# Patient Record
Sex: FEMALE | Race: WHITE | NOT HISPANIC OR LATINO | Employment: UNEMPLOYED | URBAN - METROPOLITAN AREA
[De-identification: names, ages, dates, MRNs, and addresses within clinical notes are randomized per-mention and may not be internally consistent; named-entity substitution may affect disease eponyms.]

---

## 2018-07-15 ENCOUNTER — HOSPITAL ENCOUNTER (OUTPATIENT)
Facility: HOSPITAL | Age: 28
Discharge: HOME | End: 2018-07-16
Attending: EMERGENCY MEDICINE | Admitting: EMERGENCY MEDICINE

## 2018-07-15 DIAGNOSIS — K80.20 CHOLECYSTOLITHIASIS: ICD-10-CM

## 2018-07-15 DIAGNOSIS — K81.0 ACUTE CHOLECYSTITIS: Primary | ICD-10-CM

## 2018-07-15 PROCEDURE — 99284 EMERGENCY DEPT VISIT MOD MDM: CPT

## 2018-07-15 PROCEDURE — G0378 HOSPITAL OBSERVATION PER HR: HCPCS

## 2018-07-15 PROCEDURE — 96375 TX/PRO/DX INJ NEW DRUG ADDON: CPT

## 2018-07-15 RX ORDER — SODIUM CHLORIDE 0.9 % (FLUSH) 0.9 %
10 SYRINGE (ML) INJECTION AS NEEDED
Status: DISCONTINUED | OUTPATIENT
Start: 2018-07-15 | End: 2018-07-16 | Stop reason: HOSPADM

## 2018-07-16 ENCOUNTER — ANESTHESIA EVENT (OUTPATIENT)
Dept: PERIOP | Facility: HOSPITAL | Age: 28
End: 2018-07-16

## 2018-07-16 ENCOUNTER — APPOINTMENT (OUTPATIENT)
Dept: CT IMAGING | Facility: HOSPITAL | Age: 28
End: 2018-07-16

## 2018-07-16 ENCOUNTER — ANESTHESIA (OUTPATIENT)
Dept: PERIOP | Facility: HOSPITAL | Age: 28
End: 2018-07-16

## 2018-07-16 VITALS
SYSTOLIC BLOOD PRESSURE: 120 MMHG | TEMPERATURE: 98.8 F | WEIGHT: 154.98 LBS | HEIGHT: 60 IN | HEART RATE: 66 BPM | OXYGEN SATURATION: 100 % | BODY MASS INDEX: 30.43 KG/M2 | RESPIRATION RATE: 16 BRPM | DIASTOLIC BLOOD PRESSURE: 77 MMHG

## 2018-07-16 PROBLEM — K81.0 ACUTE CHOLECYSTITIS: Status: ACTIVE | Noted: 2018-07-16

## 2018-07-16 PROBLEM — K81.9 CHOLECYSTITIS: Status: ACTIVE | Noted: 2018-07-16

## 2018-07-16 PROBLEM — K81.0 ACUTE CHOLECYSTITIS: Status: RESOLVED | Noted: 2018-07-16 | Resolved: 2018-07-16

## 2018-07-16 PROBLEM — K81.9 CHOLECYSTITIS: Status: RESOLVED | Noted: 2018-07-16 | Resolved: 2018-07-16

## 2018-07-16 LAB
ALBUMIN SERPL-MCNC: 4.4 G/DL (ref 3.5–5.2)
ALBUMIN/GLOB SERPL: 1.6 G/DL
ALP SERPL-CCNC: 94 U/L (ref 39–117)
ALT SERPL W P-5'-P-CCNC: 24 U/L (ref 1–33)
ANION GAP SERPL CALCULATED.3IONS-SCNC: 11.8 MMOL/L
AST SERPL-CCNC: 12 U/L (ref 1–32)
BASOPHILS # BLD AUTO: 0.03 10*3/MM3 (ref 0–0.2)
BASOPHILS NFR BLD AUTO: 0.2 % (ref 0–1.5)
BILIRUB SERPL-MCNC: 0.2 MG/DL (ref 0.1–1.2)
BILIRUB UR QL STRIP: NEGATIVE
BUN BLD-MCNC: 15 MG/DL (ref 6–20)
BUN/CREAT SERPL: 23.1 (ref 7–25)
CALCIUM SPEC-SCNC: 8.9 MG/DL (ref 8.6–10.5)
CHLORIDE SERPL-SCNC: 104 MMOL/L (ref 98–107)
CLARITY UR: CLEAR
CO2 SERPL-SCNC: 24.2 MMOL/L (ref 22–29)
COLOR UR: YELLOW
CREAT BLD-MCNC: 0.65 MG/DL (ref 0.57–1)
DEPRECATED RDW RBC AUTO: 47.2 FL (ref 37–54)
EOSINOPHIL # BLD AUTO: 0.15 10*3/MM3 (ref 0–0.7)
EOSINOPHIL NFR BLD AUTO: 1 % (ref 0.3–6.2)
ERYTHROCYTE [DISTWIDTH] IN BLOOD BY AUTOMATED COUNT: 12.8 % (ref 11.7–13)
GFR SERPL CREATININE-BSD FRML MDRD: 109 ML/MIN/1.73
GLOBULIN UR ELPH-MCNC: 2.8 GM/DL
GLUCOSE BLD-MCNC: 128 MG/DL (ref 65–99)
GLUCOSE UR STRIP-MCNC: NEGATIVE MG/DL
HCG SERPL QL: NEGATIVE
HCT VFR BLD AUTO: 43.8 % (ref 35.6–45.5)
HGB BLD-MCNC: 14 G/DL (ref 11.9–15.5)
HGB UR QL STRIP.AUTO: NEGATIVE
IMM GRANULOCYTES # BLD: 0.04 10*3/MM3 (ref 0–0.03)
IMM GRANULOCYTES NFR BLD: 0.3 % (ref 0–0.5)
KETONES UR QL STRIP: NEGATIVE
LEUKOCYTE ESTERASE UR QL STRIP.AUTO: NEGATIVE
LIPASE SERPL-CCNC: 70 U/L (ref 13–60)
LYMPHOCYTES # BLD AUTO: 2.43 10*3/MM3 (ref 0.9–4.8)
LYMPHOCYTES NFR BLD AUTO: 16.8 % (ref 19.6–45.3)
MCH RBC QN AUTO: 32 PG (ref 26.9–32)
MCHC RBC AUTO-ENTMCNC: 32 G/DL (ref 32.4–36.3)
MCV RBC AUTO: 100 FL (ref 80.5–98.2)
MONOCYTES # BLD AUTO: 0.54 10*3/MM3 (ref 0.2–1.2)
MONOCYTES NFR BLD AUTO: 3.7 % (ref 5–12)
NEUTROPHILS # BLD AUTO: 11.3 10*3/MM3 (ref 1.9–8.1)
NEUTROPHILS NFR BLD AUTO: 78 % (ref 42.7–76)
NITRITE UR QL STRIP: NEGATIVE
PH UR STRIP.AUTO: 5.5 [PH] (ref 5–8)
PLATELET # BLD AUTO: 212 10*3/MM3 (ref 140–500)
PMV BLD AUTO: 10.5 FL (ref 6–12)
POTASSIUM BLD-SCNC: 4 MMOL/L (ref 3.5–5.2)
PROT SERPL-MCNC: 7.2 G/DL (ref 6–8.5)
PROT UR QL STRIP: NEGATIVE
RBC # BLD AUTO: 4.38 10*6/MM3 (ref 3.9–5.2)
SODIUM BLD-SCNC: 140 MMOL/L (ref 136–145)
SP GR UR STRIP: 1.03 (ref 1–1.03)
UROBILINOGEN UR QL STRIP: NORMAL
WBC NRBC COR # BLD: 14.49 10*3/MM3 (ref 4.5–10.7)

## 2018-07-16 PROCEDURE — 25010000002 HYDROMORPHONE PER 4 MG: Performed by: NURSE ANESTHETIST, CERTIFIED REGISTERED

## 2018-07-16 PROCEDURE — 81003 URINALYSIS AUTO W/O SCOPE: CPT | Performed by: EMERGENCY MEDICINE

## 2018-07-16 PROCEDURE — 25010000002 ONDANSETRON PER 1 MG: Performed by: ANESTHESIOLOGY

## 2018-07-16 PROCEDURE — 25010000002 KETOROLAC TROMETHAMINE PER 15 MG: Performed by: SURGERY

## 2018-07-16 PROCEDURE — 25010000002 DEXAMETHASONE PER 1 MG: Performed by: ANESTHESIOLOGY

## 2018-07-16 PROCEDURE — 25010000002 PIPERACILLIN SOD-TAZOBACTAM PER 1 G: Performed by: SURGERY

## 2018-07-16 PROCEDURE — 25010000002 MIDAZOLAM PER 1 MG: Performed by: ANESTHESIOLOGY

## 2018-07-16 PROCEDURE — 25010000002 PIPERACILLIN SOD-TAZOBACTAM PER 1 G: Performed by: EMERGENCY MEDICINE

## 2018-07-16 PROCEDURE — G0378 HOSPITAL OBSERVATION PER HR: HCPCS

## 2018-07-16 PROCEDURE — 83690 ASSAY OF LIPASE: CPT | Performed by: EMERGENCY MEDICINE

## 2018-07-16 PROCEDURE — 25010000002 PROPOFOL 10 MG/ML EMULSION: Performed by: ANESTHESIOLOGY

## 2018-07-16 PROCEDURE — 25010000002 METOCLOPRAMIDE PER 10 MG: Performed by: SURGERY

## 2018-07-16 PROCEDURE — 25010000002 KETOROLAC TROMETHAMINE PER 15 MG: Performed by: ANESTHESIOLOGY

## 2018-07-16 PROCEDURE — 25010000002 IOPAMIDOL 61 % SOLUTION: Performed by: EMERGENCY MEDICINE

## 2018-07-16 PROCEDURE — 85025 COMPLETE CBC W/AUTO DIFF WBC: CPT | Performed by: EMERGENCY MEDICINE

## 2018-07-16 PROCEDURE — 96375 TX/PRO/DX INJ NEW DRUG ADDON: CPT

## 2018-07-16 PROCEDURE — 25010000002 HYDROMORPHONE PER 4 MG: Performed by: SURGERY

## 2018-07-16 PROCEDURE — 84703 CHORIONIC GONADOTROPIN ASSAY: CPT | Performed by: EMERGENCY MEDICINE

## 2018-07-16 PROCEDURE — 25010000002 FENTANYL CITRATE (PF) 100 MCG/2ML SOLUTION: Performed by: ANESTHESIOLOGY

## 2018-07-16 PROCEDURE — 74177 CT ABD & PELVIS W/CONTRAST: CPT

## 2018-07-16 PROCEDURE — 80053 COMPREHEN METABOLIC PANEL: CPT | Performed by: EMERGENCY MEDICINE

## 2018-07-16 PROCEDURE — 88304 TISSUE EXAM BY PATHOLOGIST: CPT | Performed by: SURGERY

## 2018-07-16 PROCEDURE — 25010000002 FENTANYL CITRATE (PF) 100 MCG/2ML SOLUTION: Performed by: NURSE ANESTHETIST, CERTIFIED REGISTERED

## 2018-07-16 RX ORDER — PROMETHAZINE HYDROCHLORIDE 25 MG/ML
12.5 INJECTION, SOLUTION INTRAMUSCULAR; INTRAVENOUS ONCE AS NEEDED
Status: DISCONTINUED | OUTPATIENT
Start: 2018-07-16 | End: 2018-07-16

## 2018-07-16 RX ORDER — NALOXONE HCL 0.4 MG/ML
0.2 VIAL (ML) INJECTION AS NEEDED
Status: DISCONTINUED | OUTPATIENT
Start: 2018-07-16 | End: 2018-07-16

## 2018-07-16 RX ORDER — KETOROLAC TROMETHAMINE 30 MG/ML
INJECTION, SOLUTION INTRAMUSCULAR; INTRAVENOUS AS NEEDED
Status: DISCONTINUED | OUTPATIENT
Start: 2018-07-16 | End: 2018-07-16 | Stop reason: SURG

## 2018-07-16 RX ORDER — FAMOTIDINE 10 MG/ML
20 INJECTION, SOLUTION INTRAVENOUS ONCE
Status: COMPLETED | OUTPATIENT
Start: 2018-07-16 | End: 2018-07-16

## 2018-07-16 RX ORDER — LIDOCAINE HYDROCHLORIDE 10 MG/ML
0.5 INJECTION, SOLUTION EPIDURAL; INFILTRATION; INTRACAUDAL; PERINEURAL ONCE AS NEEDED
Status: DISCONTINUED | OUTPATIENT
Start: 2018-07-16 | End: 2018-07-16 | Stop reason: HOSPADM

## 2018-07-16 RX ORDER — SODIUM CHLORIDE 0.9 % (FLUSH) 0.9 %
1-10 SYRINGE (ML) INJECTION AS NEEDED
Status: DISCONTINUED | OUTPATIENT
Start: 2018-07-16 | End: 2018-07-16 | Stop reason: HOSPADM

## 2018-07-16 RX ORDER — OXYCODONE AND ACETAMINOPHEN 7.5; 325 MG/1; MG/1
1 TABLET ORAL ONCE AS NEEDED
Status: DISCONTINUED | OUTPATIENT
Start: 2018-07-16 | End: 2018-07-16

## 2018-07-16 RX ORDER — SODIUM CHLORIDE, SODIUM LACTATE, POTASSIUM CHLORIDE, CALCIUM CHLORIDE 600; 310; 30; 20 MG/100ML; MG/100ML; MG/100ML; MG/100ML
150 INJECTION, SOLUTION INTRAVENOUS CONTINUOUS
Status: DISCONTINUED | OUTPATIENT
Start: 2018-07-16 | End: 2018-07-16

## 2018-07-16 RX ORDER — MIDAZOLAM HYDROCHLORIDE 1 MG/ML
1 INJECTION INTRAMUSCULAR; INTRAVENOUS
Status: DISCONTINUED | OUTPATIENT
Start: 2018-07-16 | End: 2018-07-16 | Stop reason: HOSPADM

## 2018-07-16 RX ORDER — DIPHENHYDRAMINE HYDROCHLORIDE 50 MG/ML
12.5 INJECTION INTRAMUSCULAR; INTRAVENOUS
Status: DISCONTINUED | OUTPATIENT
Start: 2018-07-16 | End: 2018-07-16

## 2018-07-16 RX ORDER — DEXAMETHASONE SODIUM PHOSPHATE 10 MG/ML
INJECTION INTRAMUSCULAR; INTRAVENOUS AS NEEDED
Status: DISCONTINUED | OUTPATIENT
Start: 2018-07-16 | End: 2018-07-16 | Stop reason: SURG

## 2018-07-16 RX ORDER — MAGNESIUM HYDROXIDE 1200 MG/15ML
LIQUID ORAL AS NEEDED
Status: DISCONTINUED | OUTPATIENT
Start: 2018-07-16 | End: 2018-07-16 | Stop reason: HOSPADM

## 2018-07-16 RX ORDER — HYDROMORPHONE HYDROCHLORIDE 1 MG/ML
0.5 INJECTION, SOLUTION INTRAMUSCULAR; INTRAVENOUS; SUBCUTANEOUS
Status: DISCONTINUED | OUTPATIENT
Start: 2018-07-16 | End: 2018-07-16

## 2018-07-16 RX ORDER — PROMETHAZINE HYDROCHLORIDE 25 MG/1
25 TABLET ORAL ONCE AS NEEDED
Status: DISCONTINUED | OUTPATIENT
Start: 2018-07-16 | End: 2018-07-16

## 2018-07-16 RX ORDER — FLUMAZENIL 0.1 MG/ML
0.2 INJECTION INTRAVENOUS AS NEEDED
Status: DISCONTINUED | OUTPATIENT
Start: 2018-07-16 | End: 2018-07-16

## 2018-07-16 RX ORDER — HYDROCODONE BITARTRATE AND ACETAMINOPHEN 7.5; 325 MG/1; MG/1
1 TABLET ORAL ONCE AS NEEDED
Status: DISCONTINUED | OUTPATIENT
Start: 2018-07-16 | End: 2018-07-16

## 2018-07-16 RX ORDER — SODIUM CHLORIDE 9 MG/ML
INJECTION, SOLUTION INTRAVENOUS AS NEEDED
Status: DISCONTINUED | OUTPATIENT
Start: 2018-07-16 | End: 2018-07-16 | Stop reason: HOSPADM

## 2018-07-16 RX ORDER — LABETALOL HYDROCHLORIDE 5 MG/ML
5 INJECTION, SOLUTION INTRAVENOUS
Status: DISCONTINUED | OUTPATIENT
Start: 2018-07-16 | End: 2018-07-16

## 2018-07-16 RX ORDER — OXYCODONE HYDROCHLORIDE AND ACETAMINOPHEN 5; 325 MG/1; MG/1
1 TABLET ORAL EVERY 4 HOURS PRN
Status: DISCONTINUED | OUTPATIENT
Start: 2018-07-16 | End: 2018-07-16 | Stop reason: HOSPADM

## 2018-07-16 RX ORDER — SODIUM CHLORIDE, SODIUM LACTATE, POTASSIUM CHLORIDE, CALCIUM CHLORIDE 600; 310; 30; 20 MG/100ML; MG/100ML; MG/100ML; MG/100ML
75 INJECTION, SOLUTION INTRAVENOUS CONTINUOUS
Status: DISCONTINUED | OUTPATIENT
Start: 2018-07-16 | End: 2018-07-16 | Stop reason: HOSPADM

## 2018-07-16 RX ORDER — MIDAZOLAM HYDROCHLORIDE 1 MG/ML
2 INJECTION INTRAMUSCULAR; INTRAVENOUS
Status: DISCONTINUED | OUTPATIENT
Start: 2018-07-16 | End: 2018-07-16 | Stop reason: HOSPADM

## 2018-07-16 RX ORDER — ONDANSETRON 2 MG/ML
INJECTION INTRAMUSCULAR; INTRAVENOUS AS NEEDED
Status: DISCONTINUED | OUTPATIENT
Start: 2018-07-16 | End: 2018-07-16 | Stop reason: SURG

## 2018-07-16 RX ORDER — ONDANSETRON 2 MG/ML
4 INJECTION INTRAMUSCULAR; INTRAVENOUS ONCE AS NEEDED
Status: DISCONTINUED | OUTPATIENT
Start: 2018-07-16 | End: 2018-07-16

## 2018-07-16 RX ORDER — HYDROMORPHONE HYDROCHLORIDE 1 MG/ML
0.5 INJECTION, SOLUTION INTRAMUSCULAR; INTRAVENOUS; SUBCUTANEOUS
Status: DISCONTINUED | OUTPATIENT
Start: 2018-07-16 | End: 2018-07-16 | Stop reason: HOSPADM

## 2018-07-16 RX ORDER — BUPIVACAINE HYDROCHLORIDE AND EPINEPHRINE 2.5; 5 MG/ML; UG/ML
INJECTION, SOLUTION INFILTRATION; PERINEURAL AS NEEDED
Status: DISCONTINUED | OUTPATIENT
Start: 2018-07-16 | End: 2018-07-16 | Stop reason: HOSPADM

## 2018-07-16 RX ORDER — PROPOFOL 10 MG/ML
VIAL (ML) INTRAVENOUS AS NEEDED
Status: DISCONTINUED | OUTPATIENT
Start: 2018-07-16 | End: 2018-07-16 | Stop reason: SURG

## 2018-07-16 RX ORDER — LIDOCAINE HYDROCHLORIDE 40 MG/ML
SOLUTION TOPICAL AS NEEDED
Status: DISCONTINUED | OUTPATIENT
Start: 2018-07-16 | End: 2018-07-16 | Stop reason: SURG

## 2018-07-16 RX ORDER — OXYCODONE HYDROCHLORIDE AND ACETAMINOPHEN 5; 325 MG/1; MG/1
1-2 TABLET ORAL EVERY 4 HOURS PRN
Qty: 30 TABLET | Refills: 0 | Status: SHIPPED | OUTPATIENT
Start: 2018-07-16

## 2018-07-16 RX ORDER — KETOROLAC TROMETHAMINE 30 MG/ML
30 INJECTION, SOLUTION INTRAMUSCULAR; INTRAVENOUS ONCE
Status: COMPLETED | OUTPATIENT
Start: 2018-07-16 | End: 2018-07-16

## 2018-07-16 RX ORDER — SCOLOPAMINE TRANSDERMAL SYSTEM 1 MG/1
1 PATCH, EXTENDED RELEASE TRANSDERMAL
Status: DISCONTINUED | OUTPATIENT
Start: 2018-07-16 | End: 2018-07-16 | Stop reason: HOSPADM

## 2018-07-16 RX ORDER — PROMETHAZINE HYDROCHLORIDE 25 MG/1
25 SUPPOSITORY RECTAL ONCE AS NEEDED
Status: DISCONTINUED | OUTPATIENT
Start: 2018-07-16 | End: 2018-07-16

## 2018-07-16 RX ORDER — ROCURONIUM BROMIDE 10 MG/ML
INJECTION, SOLUTION INTRAVENOUS AS NEEDED
Status: DISCONTINUED | OUTPATIENT
Start: 2018-07-16 | End: 2018-07-16 | Stop reason: SURG

## 2018-07-16 RX ORDER — SODIUM CHLORIDE, SODIUM LACTATE, POTASSIUM CHLORIDE, CALCIUM CHLORIDE 600; 310; 30; 20 MG/100ML; MG/100ML; MG/100ML; MG/100ML
9 INJECTION, SOLUTION INTRAVENOUS CONTINUOUS
Status: DISCONTINUED | OUTPATIENT
Start: 2018-07-16 | End: 2018-07-16 | Stop reason: HOSPADM

## 2018-07-16 RX ORDER — OXYCODONE HYDROCHLORIDE AND ACETAMINOPHEN 5; 325 MG/1; MG/1
2 TABLET ORAL ONCE AS NEEDED
Status: DISCONTINUED | OUTPATIENT
Start: 2018-07-16 | End: 2018-07-16 | Stop reason: HOSPADM

## 2018-07-16 RX ORDER — PROMETHAZINE HYDROCHLORIDE 25 MG/ML
12.5 INJECTION, SOLUTION INTRAMUSCULAR; INTRAVENOUS EVERY 4 HOURS PRN
Status: DISCONTINUED | OUTPATIENT
Start: 2018-07-16 | End: 2018-07-16 | Stop reason: HOSPADM

## 2018-07-16 RX ORDER — EPHEDRINE SULFATE 50 MG/ML
5 INJECTION, SOLUTION INTRAVENOUS ONCE AS NEEDED
Status: DISCONTINUED | OUTPATIENT
Start: 2018-07-16 | End: 2018-07-16

## 2018-07-16 RX ORDER — METOCLOPRAMIDE HYDROCHLORIDE 5 MG/ML
10 INJECTION INTRAMUSCULAR; INTRAVENOUS ONCE
Status: COMPLETED | OUTPATIENT
Start: 2018-07-16 | End: 2018-07-16

## 2018-07-16 RX ORDER — LIDOCAINE HYDROCHLORIDE 20 MG/ML
INJECTION, SOLUTION INFILTRATION; PERINEURAL AS NEEDED
Status: DISCONTINUED | OUTPATIENT
Start: 2018-07-16 | End: 2018-07-16 | Stop reason: SURG

## 2018-07-16 RX ORDER — FENTANYL CITRATE 50 UG/ML
50 INJECTION, SOLUTION INTRAMUSCULAR; INTRAVENOUS
Status: DISCONTINUED | OUTPATIENT
Start: 2018-07-16 | End: 2018-07-16

## 2018-07-16 RX ORDER — FENTANYL CITRATE 50 UG/ML
50 INJECTION, SOLUTION INTRAMUSCULAR; INTRAVENOUS
Status: DISCONTINUED | OUTPATIENT
Start: 2018-07-16 | End: 2018-07-16 | Stop reason: HOSPADM

## 2018-07-16 RX ORDER — PROMETHAZINE HYDROCHLORIDE 25 MG/1
12.5 TABLET ORAL ONCE AS NEEDED
Status: DISCONTINUED | OUTPATIENT
Start: 2018-07-16 | End: 2018-07-16

## 2018-07-16 RX ORDER — OXYCODONE HYDROCHLORIDE AND ACETAMINOPHEN 5; 325 MG/1; MG/1
2 TABLET ORAL EVERY 4 HOURS PRN
Status: DISCONTINUED | OUTPATIENT
Start: 2018-07-16 | End: 2018-07-16 | Stop reason: HOSPADM

## 2018-07-16 RX ADMIN — DEXAMETHASONE SODIUM PHOSPHATE 8 MG: 10 INJECTION INTRAMUSCULAR; INTRAVENOUS at 13:45

## 2018-07-16 RX ADMIN — FAMOTIDINE 20 MG: 10 INJECTION, SOLUTION INTRAVENOUS at 12:27

## 2018-07-16 RX ADMIN — LIDOCAINE HYDROCHLORIDE 100 MG: 20 INJECTION, SOLUTION INFILTRATION; PERINEURAL at 13:34

## 2018-07-16 RX ADMIN — ONDANSETRON 4 MG: 2 INJECTION INTRAMUSCULAR; INTRAVENOUS at 13:48

## 2018-07-16 RX ADMIN — KETOROLAC TROMETHAMINE 30 MG: 30 INJECTION, SOLUTION INTRAMUSCULAR at 14:26

## 2018-07-16 RX ADMIN — SODIUM CHLORIDE, POTASSIUM CHLORIDE, SODIUM LACTATE AND CALCIUM CHLORIDE 150 ML/HR: 600; 310; 30; 20 INJECTION, SOLUTION INTRAVENOUS at 02:28

## 2018-07-16 RX ADMIN — TAZOBACTAM SODIUM AND PIPERACILLIN SODIUM 4.5 G: 500; 4 INJECTION, SOLUTION INTRAVENOUS at 02:28

## 2018-07-16 RX ADMIN — IOPAMIDOL 85 ML: 612 INJECTION, SOLUTION INTRAVENOUS at 01:11

## 2018-07-16 RX ADMIN — TAZOBACTAM SODIUM AND PIPERACILLIN SODIUM 3.38 G: 375; 3 INJECTION, SOLUTION INTRAVENOUS at 09:27

## 2018-07-16 RX ADMIN — LIDOCAINE HYDROCHLORIDE 1 EACH: 40 SOLUTION TOPICAL at 13:38

## 2018-07-16 RX ADMIN — MIDAZOLAM 2 MG: 1 INJECTION INTRAMUSCULAR; INTRAVENOUS at 12:27

## 2018-07-16 RX ADMIN — FENTANYL CITRATE 50 MCG: 50 INJECTION INTRAMUSCULAR; INTRAVENOUS at 13:34

## 2018-07-16 RX ADMIN — SODIUM CHLORIDE, POTASSIUM CHLORIDE, SODIUM LACTATE AND CALCIUM CHLORIDE 9 ML/HR: 600; 310; 30; 20 INJECTION, SOLUTION INTRAVENOUS at 11:55

## 2018-07-16 RX ADMIN — FENTANYL CITRATE 50 MCG: 50 INJECTION, SOLUTION INTRAMUSCULAR; INTRAVENOUS at 14:21

## 2018-07-16 RX ADMIN — METOCLOPRAMIDE 10 MG: 5 INJECTION, SOLUTION INTRAMUSCULAR; INTRAVENOUS at 14:26

## 2018-07-16 RX ADMIN — SUGAMMADEX 500 MG: 100 INJECTION, SOLUTION INTRAVENOUS at 13:54

## 2018-07-16 RX ADMIN — FENTANYL CITRATE 50 MCG: 50 INJECTION INTRAMUSCULAR; INTRAVENOUS at 13:55

## 2018-07-16 RX ADMIN — PROPOFOL 200 MG: 10 INJECTION, EMULSION INTRAVENOUS at 13:34

## 2018-07-16 RX ADMIN — HYDROMORPHONE HYDROCHLORIDE 0.5 MG: 1 INJECTION, SOLUTION INTRAMUSCULAR; INTRAVENOUS; SUBCUTANEOUS at 02:21

## 2018-07-16 RX ADMIN — HYDROMORPHONE HYDROCHLORIDE 0.5 MG: 1 INJECTION, SOLUTION INTRAMUSCULAR; INTRAVENOUS; SUBCUTANEOUS at 14:22

## 2018-07-16 RX ADMIN — ROCURONIUM BROMIDE 35 MG: 10 INJECTION INTRAVENOUS at 13:34

## 2018-07-16 RX ADMIN — KETOROLAC TROMETHAMINE 30 MG: 30 INJECTION, SOLUTION INTRAMUSCULAR; INTRAVENOUS at 13:54

## 2018-07-16 RX ADMIN — FENTANYL CITRATE 50 MCG: 50 INJECTION, SOLUTION INTRAMUSCULAR; INTRAVENOUS at 14:45

## 2018-07-16 NOTE — H&P
2018    Maricel Perry  [id number]      GENERAL SURGERY HISTORY AND PHYSICAL    ADMITTING PHYSICIAN:  Reymundo Stallings M.D.    DIAGNOSIS:  Acute Cholecystitis, cholelithiasis.    HISTORY:  Maricel Perry presents today with a 1-2 day history of abdominal pain localizing to the right upper quadrant.  She had a similar attack 2 or 3 months ago and presented to another facility where they checked her blood work and sent her home.  No other testing was done.  With this second attack, she presented to [facility] for further evaluation and had other testing which confirm the diagnosis.  It is not associated with fever and chills.  It is associated with no nausea and no vomiting.  It is associated with anorexia. It is not associated with diarrhea.  There have been no urinary complaints.  There have been no changes in the color of the urine or stool.  She has  had a CT Scan to confirm the diagnosis.  Blood work reveals normal liver enzymes.    History reviewed. No pertinent past medical history.  Past Surgical History:   Procedure Laterality Date   •  SECTION     • HERNIA REPAIR     • HERNIA REPAIR       Social History     Social History   • Marital status: Single     Spouse name: N/A   • Number of children: N/A   • Years of education: N/A     Occupational History   • Not on file.     Social History Main Topics   • Smoking status: Current Every Day Smoker     Packs/day: 0.50     Start date:    • Smokeless tobacco: Never Used   • Alcohol use Yes      Comment: occasional   • Drug use: No   • Sexual activity: Yes     Other Topics Concern   • Not on file     Social History Narrative   • No narrative on file     Family History   Problem Relation Age of Onset   • Arthritis Maternal Grandmother    • Alcohol abuse Paternal Grandfather        Review of Systems  On questioning, the patient denies any weight loss, fatigue or headache, no visual changes or hearing complaints, no new cardiovascular or  "pulmonary complaints, no  complaints, no GYN complaints, no musculoskeletal or neurologic complaints, no skin, bleeding, psychiatric or endocrine complaints.    Physical Exam  /76 (BP Location: Left arm, Patient Position: Lying)   Pulse 71   Temp 97 °F (36.1 °C) (Oral)   Resp 18   Ht 152.4 cm (60\")   Wt 70.3 kg (154 lb 15.7 oz)   SpO2 98%   BMI 30.27 kg/m²     On exam, patient is alert oriented and appropriate and is in no acute distress.  HEENT:   Head is normocephalic, both external ears are normal and sclerae are nonicteric.  Neck:  Supple without lymphadenopathy.  Heart:  Regular without obvious murmur.  Chest:  Good respiratory effort bilaterally.  Abdomen:  Soft, protuberant, nondistended, tenderness moderate, without guarding, without rebound - RUQ, no masses palpated.  Rectal:  Deferred.  Extremities:  Without edema.  Skin:  Negative.    Lab Results   Component Value Date    WBC 14.49 (H) 07/16/2018    HGB 14.0 07/16/2018    HCT 43.8 07/16/2018     07/16/2018     Lab Results   Component Value Date     07/16/2018    K 4.0 07/16/2018     07/16/2018    CO2 24.2 07/16/2018    BUN 15 07/16/2018    CREATININE 0.65 07/16/2018    GLUCOSE 128 (H) 07/16/2018     Lab Results   Component Value Date    AST 12 07/16/2018    ALT 24 07/16/2018    ALKPHOS 94 07/16/2018     CT scan images and results reviewed    ASSESSMENT/PLAN:  Maricel Perry presents with fairly classic symptomatic biliary colic with cytosis and documented acute cholecystitis with cholelithiasis on CT Scan.  I went over everything with her after reviewing her studies.  I recommended consideration for laparoscopic cholecystectomy to be done this admission with discharge home soon after.  The procedure, risks, benefits and alternatives were discussed including but not limited to bleeding, infection, opening, internal organ injury as well as the need for further surgery acutely or chronically down the line.  She would like to " proceed.  I'll get that scheduled.    Reymundo Stallings M.D.

## 2018-07-16 NOTE — PLAN OF CARE
Problem: Patient Care Overview  Goal: Plan of Care Review  Outcome: Ongoing (interventions implemented as appropriate)   07/16/18 0521   Coping/Psychosocial   Plan of Care Reviewed With patient   Plan of Care Review   Progress no change   OTHER   Outcome Summary admitted with abd pain, surgeon will see in am, resting at present     Goal: Discharge Needs Assessment  Outcome: Ongoing (interventions implemented as appropriate)    Goal: Interprofessional Rounds/Family Conf  Outcome: Ongoing (interventions implemented as appropriate)      Problem: Pain, Acute (Adult)  Goal: Identify Related Risk Factors and Signs and Symptoms  Outcome: Ongoing (interventions implemented as appropriate)    Goal: Acceptable Pain Control/Comfort Level  Outcome: Ongoing (interventions implemented as appropriate)

## 2018-07-16 NOTE — ED PROVIDER NOTES
" EMERGENCY DEPARTMENT ENCOUNTER    Room Number:    Date seen:  2018  Time seen: 11:54 PM  PCP: No Known Provider  Historian: Patient, Friend      HPI:  Chief Complaint: Back Pain  Context: Maricel Perry is a 28 y.o. female who presents to the ED c/o intermittent episodes of lower back pain onset yesterday. Pt reports that her back pain worsens with movement and improves with remaining still. No known injury sustained to the back recently. Pt states that she has also had urinary urgency and upper abdominal pain. Pt denies documented fever, N/V/D, chest pain, dyspnea, dysuria, hematuria, bowel/bladder incontinence, motor/sensory deficits, saddle anesthesia, and urinary retention. Pt reports that she does not currently menstruate as pt is taking oral contraceptives. There are no other complaints at this time.     Pain Location: Lower back  Radiation: None  Quality: \"aching\"  Intensity/Severity: Moderate  Duration: Onset yesterday  Onset quality: Gradual in onset  Timing: Intermittent  Progression: Waxing and waning   Aggravating Factors: Movement  Alleviating Factors: Remaining still   Previous Episodes: None  Treatment before arrival: Unknown   Associated Symptoms: Urinary urgency, abdominal pain            PAST MEDICAL HISTORY  Active Ambulatory Problems     Diagnosis Date Noted   • No Active Ambulatory Problems     Resolved Ambulatory Problems     Diagnosis Date Noted   • No Resolved Ambulatory Problems     No Additional Past Medical History         PAST SURGICAL HISTORY  Past Surgical History:   Procedure Laterality Date   •  SECTION     • HERNIA REPAIR           FAMILY HISTORY  History reviewed. No pertinent family history.      SOCIAL HISTORY  Social History     Social History   • Marital status: Single     Spouse name: N/A   • Number of children: N/A   • Years of education: N/A     Occupational History   • Not on file.     Social History Main Topics   • Smoking status: Current Every Day Smoker    "  Packs/day: 0.50   • Smokeless tobacco: Not on file   • Alcohol use Yes      Comment: occasional   • Drug use: No   • Sexual activity: Not on file     Other Topics Concern   • Not on file     Social History Narrative   • No narrative on file         ALLERGIES  Patient has no known allergies.        REVIEW OF SYSTEMS  Review of Systems   Constitutional: Negative for chills and fever (pt denies documented fever).   HENT: Negative for congestion, rhinorrhea and sore throat.    Eyes: Negative for pain.   Respiratory: Negative for cough and shortness of breath.    Cardiovascular: Negative for chest pain, palpitations and leg swelling.   Gastrointestinal: Positive for abdominal pain. Negative for diarrhea, nausea and vomiting.   Genitourinary: Positive for urgency (urinary). Negative for difficulty urinating, dysuria, flank pain, frequency and hematuria.   Musculoskeletal: Positive for back pain. Negative for myalgias, neck pain and neck stiffness.   Skin: Negative for rash.   Neurological: Negative for dizziness, speech difficulty, weakness, light-headedness, numbness and headaches.   Psychiatric/Behavioral: Negative.    All other systems reviewed and are negative.           PHYSICAL EXAM  ED Triage Vitals   Temp Heart Rate Resp BP SpO2   07/15/18 2228 07/15/18 2228 07/15/18 2229 07/15/18 2328 07/15/18 2228   97.9 °F (36.6 °C) 63 16 131/78 100 %      Temp src Heart Rate Source Patient Position BP Location FiO2 (%)   07/15/18 2228 07/15/18 2228 -- -- --   Tympanic Monitor          Physical Exam   Constitutional: She is oriented to person, place, and time. No distress.   HENT:   Head: Normocephalic.   Mouth/Throat: Mucous membranes are normal.   Eyes: EOM are normal. Pupils are equal, round, and reactive to light.   Neck: Normal range of motion. Neck supple.   Cardiovascular: Normal rate, regular rhythm and normal heart sounds.    Pulmonary/Chest: Effort normal and breath sounds normal. No respiratory distress. She has no  decreased breath sounds. She has no wheezes. She has no rhonchi. She has no rales.   Abdominal: Soft. There is tenderness in the right upper quadrant and epigastric area. There is no rebound and no guarding.   Musculoskeletal: Normal range of motion. She exhibits tenderness (of the lower thoracic and upper lumbar region).   Neurological: She is alert and oriented to person, place, and time. She has normal sensation.   Skin: Skin is warm and dry.   Psychiatric: Mood and affect normal.   Nursing note and vitals reviewed.          LAB RESULTS  Recent Results (from the past 24 hour(s))   Comprehensive Metabolic Panel    Collection Time: 07/16/18 12:06 AM   Result Value Ref Range    Glucose 128 (H) 65 - 99 mg/dL    BUN 15 6 - 20 mg/dL    Creatinine 0.65 0.57 - 1.00 mg/dL    Sodium 140 136 - 145 mmol/L    Potassium 4.0 3.5 - 5.2 mmol/L    Chloride 104 98 - 107 mmol/L    CO2 24.2 22.0 - 29.0 mmol/L    Calcium 8.9 8.6 - 10.5 mg/dL    Total Protein 7.2 6.0 - 8.5 g/dL    Albumin 4.40 3.50 - 5.20 g/dL    ALT (SGPT) 24 1 - 33 U/L    AST (SGOT) 12 1 - 32 U/L    Alkaline Phosphatase 94 39 - 117 U/L    Total Bilirubin 0.2 0.1 - 1.2 mg/dL    eGFR Non African Amer 109 >60 mL/min/1.73    Globulin 2.8 gm/dL    A/G Ratio 1.6 g/dL    BUN/Creatinine Ratio 23.1 7.0 - 25.0    Anion Gap 11.8 mmol/L   Lipase    Collection Time: 07/16/18 12:06 AM   Result Value Ref Range    Lipase 70 (H) 13 - 60 U/L   hCG, Serum, Qualitative    Collection Time: 07/16/18 12:06 AM   Result Value Ref Range    HCG Qualitative Negative Indeterminate, Negative   CBC Auto Differential    Collection Time: 07/16/18 12:06 AM   Result Value Ref Range    WBC 14.49 (H) 4.50 - 10.70 10*3/mm3    RBC 4.38 3.90 - 5.20 10*6/mm3    Hemoglobin 14.0 11.9 - 15.5 g/dL    Hematocrit 43.8 35.6 - 45.5 %    .0 (H) 80.5 - 98.2 fL    MCH 32.0 26.9 - 32.0 pg    MCHC 32.0 (L) 32.4 - 36.3 g/dL    RDW 12.8 11.7 - 13.0 %    RDW-SD 47.2 37.0 - 54.0 fl    MPV 10.5 6.0 - 12.0 fL     Platelets 212 140 - 500 10*3/mm3    Neutrophil % 78.0 (H) 42.7 - 76.0 %    Lymphocyte % 16.8 (L) 19.6 - 45.3 %    Monocyte % 3.7 (L) 5.0 - 12.0 %    Eosinophil % 1.0 0.3 - 6.2 %    Basophil % 0.2 0.0 - 1.5 %    Immature Grans % 0.3 0.0 - 0.5 %    Neutrophils, Absolute 11.30 (H) 1.90 - 8.10 10*3/mm3    Lymphocytes, Absolute 2.43 0.90 - 4.80 10*3/mm3    Monocytes, Absolute 0.54 0.20 - 1.20 10*3/mm3    Eosinophils, Absolute 0.15 0.00 - 0.70 10*3/mm3    Basophils, Absolute 0.03 0.00 - 0.20 10*3/mm3    Immature Grans, Absolute 0.04 (H) 0.00 - 0.03 10*3/mm3   Urinalysis With Microscopic If Indicated (No Culture) - Urine, Clean Catch    Collection Time: 07/16/18 12:09 AM   Result Value Ref Range    Color, UA Yellow Yellow, Straw    Appearance, UA Clear Clear    pH, UA 5.5 5.0 - 8.0    Specific Gravity, UA 1.027 1.005 - 1.030    Glucose, UA Negative Negative    Ketones, UA Negative Negative    Bilirubin, UA Negative Negative    Blood, UA Negative Negative    Protein, UA Negative Negative    Leuk Esterase, UA Negative Negative    Nitrite, UA Negative Negative    Urobilinogen, UA 1.0 E.U./dL 0.2 - 1.0 E.U./dL       Ordered the above labs and reviewed the results.        RADIOLOGY  CT Abdomen Pelvis With Contrast   Preliminary Result   Findings of acute cholecystitis. Multiple gallstones.        Findings called to Dr. Mata, 1:16 AM.                     Ordered the above noted radiological studies. Reviewed by me in PACS.  Spoke with Dr. Root (radiologist) regarding CT Abd scan results.          PROCEDURES  Procedures          MEDICATIONS GIVEN IN ER  Medications   sodium chloride 0.9 % flush 10 mL (not administered)   piperacillin-tazobactam (ZOSYN) 4.5 g in iso-osmotic dextrose 100 mL IVPB (premix) (4.5 g Intravenous New Bag 7/16/18 0228)   HYDROmorphone (DILAUDID) injection 0.5 mg (0.5 mg Intravenous Given 7/16/18 7971)   promethazine (PHENERGAN) injection 12.5 mg (not administered)   lactated ringers infusion (150  mL/hr Intravenous New Bag 7/16/18 0228)   iopamidol (ISOVUE-300) 61 % injection 100 mL (85 mL Intravenous Given 7/16/18 0111)             PROGRESS AND CONSULTS  ED Course as of Jul 16 0238 Mon Jul 16, 2018   0127 1:27 AM  Patient with upper abdominal pain through to back and appears to have acute cholecystitis.  Discussed with Dr. Sewell who will admit.  [SL]      ED Course User Index  [SL] Héctor Mata MD       12:00 AM:  UA and blood work ordered for further evaluation.     12:40 AM:  CT Abd ordered for further evaluation.    1:20 AM:  Rechecked pt. Informed pt that her WBC is 14.49. Pt's CT Abd results are c/w acute cholecystitis. Need for pt's admission to the hospital for further evaluation and treatment/management. Pt agrees with plan. Decision time to admit: Now.      1:24 AM:  Discussed case with Dr. Stallings, general surgeon. He will admit pt to a med/surg bed.     1:27 AM:  Zosyn ordered to treat for cholecystitis.           MEDICAL DECISION MAKING      MDM  Number of Diagnoses or Management Options  Acute cholecystitis:      Amount and/or Complexity of Data Reviewed  Clinical lab tests: ordered and reviewed (WBC is 14.49. )  Tests in the radiology section of CPT®: ordered and reviewed (CT Abd:  Findings of acute cholecystitis. Multiple gallstones.)  Discussion of test results with the performing providers: yes (CT Abd results d/w radiologist.   )  Discuss the patient with other providers: yes (Discussed the case with Dr. Stallings, general surgeon.  )    Patient Progress  Patient progress: stable             DIAGNOSIS  Final diagnoses:   Acute cholecystitis         DISPOSITION  Pt admitted to med/surg.      ADMISSION    Discussed treatment plan and reason for admission with pt/family and admitting physician.  Pt/family voiced understanding of the plan for admission for further testing/treatment as needed.                 Latest Documented Vital Signs:  As of 2:30 AM  BP- 134/81 HR- 68 Temp- 97.9 °F (36.6  °C) (Tympanic) O2 sat- 100%        --  Documentation assistance provided by willi Dunlap for Dr. Adolfo MD.  Information recorded by the scribe was done at my direction and has been verified and validated by me.           Hebert Dunlap  07/16/18 0238       Héctor Mata MD  07/16/18 0242

## 2018-07-16 NOTE — ANESTHESIA POSTPROCEDURE EVALUATION
"Patient: April Orlando    Procedure Summary     Date:  07/16/18 Room / Location:  [facility] OR 04 / [facility] MAIN OR    Anesthesia Start:  1328 Anesthesia Stop:  1412    Procedure:  CHOLECYSTECTOMY LAPAROSCOPIC (N/A Abdomen) Diagnosis:      Surgeon:  Reymundo Stallings MD Provider:  David Patel MD    Anesthesia Type:  general ASA Status:  2          Anesthesia Type: general  Last vitals  BP   113/73 (07/16/18 1445)   Temp   36.4 °C (97.6 °F) (07/16/18 1407)   Pulse   63 (07/16/18 1445)   Resp   16 (07/16/18 1445)     SpO2   100 % (07/16/18 1445)     Post Anesthesia Care and Evaluation    Patient location during evaluation: bedside  Patient participation: complete - patient participated  Level of consciousness: awake  Pain score: 2  Pain management: adequate  Airway patency: patent  Anesthetic complications: No anesthetic complications  PONV Status: none  Cardiovascular status: acceptable  Respiratory status: acceptable  Hydration status: acceptable    Comments: /73   Pulse 63   Temp 36.4 °C (97.6 °F) (Oral)   Resp 16   Ht 152.4 cm (60\")   Wt 70.3 kg (154 lb 15.7 oz)   LMP  (LMP Unknown) Comment: Pt. HAS IMPLANTED BIRTH CONTROL IN LEFT UPPER ARM. Pt. DOESN'T HAVE PERIODS.  SpO2 100%   BMI 30.27 kg/m²         "

## 2018-07-16 NOTE — ANESTHESIA PROCEDURE NOTES
Airway  Urgency: elective    Airway not difficult    General Information and Staff    Patient location during procedure: OR  Anesthesiologist: LEONEL WHITTINGTON    Indications and Patient Condition  Indications for airway management: airway protection    Preoxygenated: yes  MILS maintained throughout  Mask difficulty assessment: 1 - vent by mask    Final Airway Details  Final airway type: endotracheal airway      Successful airway: ETT  Cuffed: yes   Successful intubation technique: direct laryngoscopy  Facilitating devices/methods: intubating stylet and cricoid pressure  Endotracheal tube insertion site: oral  Blade: Sabra  Blade size: #3  ETT size: 7.5 mm  Cormack-Lehane Classification: grade IIb - view of arytenoids or posterior of glottis only  Placement verified by: chest auscultation and capnometry   Measured from: lips  Number of attempts at approach: 1

## 2018-07-16 NOTE — OP NOTE
July 16, 2018    Maricel Perry  [id number]    PROCEDURE:  Laparoscopic cholecystectomy.    PREOPERATIVE DIAGNOSIS:  acute cholecystitis with cholelithiasis.    POSTOPERATIVE DIAGNOSIS:  Same.     SURGEON:  Reymundo Stallings M.D.    ASSISTANT:  None.    ANESTHESIA:  GETA.    ESTIMATED BLOOD LOSS:  Minimal.    SPECIMENS: Gallbladder.    FINDINGS:  Edematous gallbladder.    INDICATIONS:  Patient presents today with a recently diagnosed acute cholecystitis with cholelithiasis. After seeing the patient and reviewing the studies, I recommended consideration for laparoscopic cholecystectomy.  The procedure, risks, benefits and alternatives were discussed including but not limited to bleeding, infection, opening, internal organ injury as well as the need for further surgery acutely or chronically down the line.  They expressed agreement and understanding.  Questions were answered to their satisfaction.    PROCEDURE:  Patient was taken to the operating room and placed supine on the operating room table. After establishment of adequate general endotracheal anesthesia, the abdomen was prepped and draped in normal sterile fashion.  The procedure was then initiated by making a 5 mm transverse infraumbilical incision through which a bladeless was trocar was inserted into the abdominal cavity.  After insufflation a camera was inserted and the abdomen cavity was inspected.  With no contraindications, I then placed under direct vision 3 subcostal trochars, an 11 mm subxiphoid trocar, and 2 lateral 5 mm trochars. Two blunt gallbladder graspers were then inserted and the gallbladder was dissected free and elevated.  I grabbed the fundus first and retracted superiorly and the Kapadia's pouch and retracted laterally.  This opened up the triangle of Calot.  Both blunt and sharp dissection were then used to define the length of the gallbladder, the Kapadia's pouch and the triangle of Calot.  Once in the triangle, I then dissected out the  cystic duct and artery.  Both were identified, completely cleared, clipped and divided.  With both structures divided and no other structures identified, and the anatomic relationships confirmed, I then began removing the gallbladder from its hepatic attachments using cautery and countertraction.  Once the gallbladder was completely  from the liver bed, it was then removed through the subxiphoid port without much difficulty.  The right upper quadrant was then irrigated with saline and inspected.  Once no evidence of continued bleeding or signs of bile leak were noted, instruments and trocars were removed and counted correctly.  Local anesthetic solution was injected into all the incisions which were then closed with 4-0 Vicryl subcuticular suture and then covered by steristrips and Band-Aids.  Anesthesia was reversed and the patient was taken recovery in satisfactory condition.    Reymundo Stallings M.D.

## 2018-07-17 LAB
CYTO UR: NORMAL
LAB AP CASE REPORT: NORMAL
PATH REPORT.FINAL DX SPEC: NORMAL
PATH REPORT.GROSS SPEC: NORMAL

## 2018-07-18 NOTE — DISCHARGE SUMMARY
July 16, 2018     Maricel Perry  [id number]      ADMITTING DIAGNOSIS:  Acute cholecystitis [K81.0]  Cholecystitis [K81.9]    DISCHARGE DIAGNOSIS:  Same.    ADMITTING MD:  Reymundo Stallings M.D.    CONSULTANTS:  None.    PRIMARY MD:  No Known Provider    PROCEDURES PERFORMED:   Procedure(s):  CHOLECYSTECTOMY LAPAROSCOPIC       HOSPITAL COURSE:  Patient is a 28 y.o. female presented with signs and symptoms of biliary colic.  Workup in the ER did reveal evidence of acute cholecystitis.  She was admitted to surgical service with plans for surgical intervention.  She underwent a laparoscopic cholecystectomy the morning after her admission which went well and confirm the diagnosis.  She was cleared for discharge soon after and given instructions at that time regarding diet activity and follow-up.     DISPOSITION:  Home.    Discharge Medications     Discharge Medications      New Medications      Instructions Start Date   oxyCODONE-acetaminophen 5-325 MG per tablet  Commonly known as:  PERCOCET   1-2 tablets, Oral, Every 4 Hours PRN         Continue These Medications      Instructions Start Date   NEXPLANON SC   Implant, every 5 years               Reymundo Stallings M.D.  07/18/18  2:12 PM    Time: 15 minutes.

## (undated) DEVICE — ENDOPATH XCEL BLADELESS TROCARS WITH STABILITY SLEEVES: Brand: ENDOPATH XCEL

## (undated) DEVICE — DRSNG WND BORDR/ADHS NONADHR/GZ LF 2X2IN STRL

## (undated) DEVICE — ANTIBACTERIAL UNDYED BRAIDED (POLYGLACTIN 910), SYNTHETIC ABSORBABLE SUTURE: Brand: COATED VICRYL

## (undated) DEVICE — SYR LUERLOK 30CC

## (undated) DEVICE — 3M™ STERI-STRIP™ ANTIMICROBIAL SKIN CLOSURES 1/2 INCH X 4 INCHES 50/CARTON 4 CARTONS/CASE A1847: Brand: 3M™ STERI-STRIP™

## (undated) DEVICE — ENDOPOUCH RETRIEVER SPECIMEN RETRIEVAL BAGS: Brand: ENDOPOUCH RETRIEVER

## (undated) DEVICE — GLV SURG BIOGEL LTX PF 7

## (undated) DEVICE — BNDG ADHS CURAD FLX/FABRC 2X4IN STRL LF

## (undated) DEVICE — 3M™ STERI-STRIP™ COMPOUND BENZOIN TINCTURE 40 BAGS/CARTON 4 CARTONS/CASE C1544: Brand: 3M™ STERI-STRIP™

## (undated) DEVICE — 3M™ STERI-STRIP™ REINFORCED ADHESIVE SKIN CLOSURES, R1547, 1/2 IN X 4 IN (12 MM X 100 MM), 6 STRIPS/ENVELOPE: Brand: 3M™ STERI-STRIP™

## (undated) DEVICE — LOU LAP CHOLE: Brand: MEDLINE INDUSTRIES, INC.

## (undated) DEVICE — APPL CHLORAPREP W/TINT 26ML ORNG